# Patient Record
Sex: FEMALE | RURAL
[De-identification: names, ages, dates, MRNs, and addresses within clinical notes are randomized per-mention and may not be internally consistent; named-entity substitution may affect disease eponyms.]

---

## 2023-09-11 ENCOUNTER — APPOINTMENT (RX ONLY)
Dept: RURAL CLINIC 2 | Facility: CLINIC | Age: 73
Setting detail: DERMATOLOGY
End: 2023-09-11

## 2023-09-11 DIAGNOSIS — D49.2 NEOPLASM OF UNSPECIFIED BEHAVIOR OF BONE, SOFT TISSUE, AND SKIN: ICD-10-CM

## 2023-09-11 PROCEDURE — ? COUNSELING

## 2023-09-11 PROCEDURE — 99202 OFFICE O/P NEW SF 15 MIN: CPT

## 2023-09-11 PROCEDURE — ? PHOTO-DOCUMENTATION

## 2023-09-11 PROCEDURE — ? ORDER ULTRASOUND

## 2023-09-11 ASSESSMENT — LOCATION SIMPLE DESCRIPTION DERM: LOCATION SIMPLE: RIGHT LOWER BACK

## 2023-09-11 ASSESSMENT — LOCATION DETAILED DESCRIPTION DERM: LOCATION DETAILED: RIGHT INFERIOR MEDIAL MIDBACK

## 2023-09-11 ASSESSMENT — LOCATION ZONE DERM: LOCATION ZONE: TRUNK

## 2023-09-11 NOTE — PROCEDURE: COUNSELING
Detail Level: Detailed
Patient Specific Counseling (Will Not Stick From Patient To Patient): Spoke with Marina Reyes, patient's daughter while examining and making recommendations

## 2023-09-11 NOTE — PROCEDURE: PHOTO-DOCUMENTATION
Adult
Photo Preface (Leave Blank If You Do Not Want): Photographs were obtained today
Detail Level: Zone

## 2023-09-11 NOTE — PROCEDURE: ORDER ULTRASOUND
Time Frame Unit: day(s)
Subcutaneous Lesion Ultrasound Reason: Patient has had painful Subcutaneous 2.5 cm firm nodule on right lower to mid back
Ultrasound Protocol: Ultrasound of Subcutaneous Mass
Priority: normal
Provider: Glo Koch M.D.
Detail Level: Simple

## 2023-09-11 NOTE — HPI: SKIN LESION
Has Your Skin Lesion Been Treated?: not been treated
Is This A New Presentation, Or A Follow-Up?: Skin Lesion
Additional History: Patient said her Dr Briscoe referred her here to have lump removed

## 2023-10-19 ENCOUNTER — APPOINTMENT (RX ONLY)
Dept: RURAL CLINIC 2 | Facility: CLINIC | Age: 73
Setting detail: DERMATOLOGY
End: 2023-10-19

## 2023-10-19 DIAGNOSIS — D49.2 NEOPLASM OF UNSPECIFIED BEHAVIOR OF BONE, SOFT TISSUE, AND SKIN: ICD-10-CM

## 2023-10-19 PROCEDURE — 99212 OFFICE O/P EST SF 10 MIN: CPT

## 2023-10-19 PROCEDURE — ? COUNSELING

## 2023-10-19 PROCEDURE — ? PATIENT SPECIFIC COUNSELING

## 2023-10-19 ASSESSMENT — LOCATION ZONE DERM: LOCATION ZONE: TRUNK

## 2023-10-19 ASSESSMENT — LOCATION DETAILED DESCRIPTION DERM: LOCATION DETAILED: RIGHT INFERIOR MEDIAL MIDBACK

## 2023-10-19 ASSESSMENT — LOCATION SIMPLE DESCRIPTION DERM: LOCATION SIMPLE: RIGHT LOWER BACK

## 2023-10-19 NOTE — PROCEDURE: PATIENT SPECIFIC COUNSELING
Since patient feels area is less symptomatic and is smaller, I feel best course of action is to recheck area in three months. Patient is agreeable and will have her daughter Marina call with any concerns
Detail Level: Zone

## 2023-10-19 NOTE — PROCEDURE: COUNSELING
Patient Specific Counseling (Will Not Stick From Patient To Patient): Ultrasound showed 1.7x1.3x 0.4cm hypoechoic lesion that may represent Lymph Node or inflammatory dermal process
Detail Level: Detailed